# Patient Record
(demographics unavailable — no encounter records)

---

## 2018-11-19 NOTE — [facility]
MRI Brain with and without contrast

 

History: Postop neoplasm of brain, occasional unsteady gait

 

Technique: Multiplanar, multi sequential pre and postcontrast MR imaging 

was performed of the brain.

 

Comparison: May 11, 2017

 

Findings: There again has been suboccipital craniotomy, resection cavity 

of the right posterior fossa involving cerebellum. There is associated 

minimal T2 and FLAIR hyperintense signal overall similar. Some subtle 

enhancement at the anterior medial most margin of the resection cavity 

axial image 8 series 9 is similar, also some thin enhancement at the 

superior margin unchanged. There is peripheral rim of decreased signal on 

the gradient echo sequence compatible with hemosiderin deposition. There 

is again right parietal developmental venous anomaly. There is no new 

intra-axial mass effect, midline shift, extra-axial fluid collection. 

Ventricular size is stable, within normal limits. There is no evidence of 

recent infarct. There is preservation of the major arterial flow voids at 

the skull base. Mastoid air cells are aerated. There is increased mild 

ethmoid air cell mucosal thickening greater anteriorly.

 

 

Impression:

1. There are stable postoperative findings of the right cerebellum, some 

mild adjacent mostly linear enhance and also hemosiderin deposition. There

is no new abnormal intracranial enhancement.

 

Electronically signed by: Mack Acevedo MD (11/19/2018 2:13 PM) 

[id number]